# Patient Record
Sex: MALE | Race: WHITE | NOT HISPANIC OR LATINO | Employment: UNEMPLOYED | URBAN - METROPOLITAN AREA
[De-identification: names, ages, dates, MRNs, and addresses within clinical notes are randomized per-mention and may not be internally consistent; named-entity substitution may affect disease eponyms.]

---

## 2024-01-18 PROBLEM — R03.0 ELEVATED BLOOD PRESSURE READING: Status: ACTIVE | Noted: 2024-01-18

## 2024-01-18 PROBLEM — M25.462 EFFUSION OF LEFT KNEE: Status: ACTIVE | Noted: 2024-01-18

## 2024-01-18 PROBLEM — J06.9 ACUTE URI: Status: ACTIVE | Noted: 2024-01-18

## 2024-01-18 PROBLEM — R05.9 COUGH: Status: ACTIVE | Noted: 2024-01-18

## 2024-01-18 PROBLEM — R79.89 LOW TESTOSTERONE: Status: ACTIVE | Noted: 2024-01-18

## 2024-01-18 PROBLEM — D17.1 LIPOMA OF BREAST: Status: ACTIVE | Noted: 2024-01-18

## 2024-01-18 PROBLEM — R10.9 ABDOMINAL PAIN: Status: ACTIVE | Noted: 2024-01-18

## 2024-01-18 PROBLEM — M54.2 NECK PAIN: Status: ACTIVE | Noted: 2024-01-18

## 2024-01-18 PROBLEM — M54.50 LOW BACK PAIN RADIATING TO BOTH LEGS: Status: ACTIVE | Noted: 2024-01-18

## 2024-01-18 PROBLEM — M79.604 LOW BACK PAIN RADIATING TO BOTH LEGS: Status: ACTIVE | Noted: 2024-01-18

## 2024-01-18 PROBLEM — S83.005A CLOSED DISLOCATION OF LEFT PATELLA: Status: ACTIVE | Noted: 2024-01-18

## 2024-01-18 PROBLEM — G47.33 OSA (OBSTRUCTIVE SLEEP APNEA): Status: ACTIVE | Noted: 2024-01-18

## 2024-01-18 PROBLEM — G47.9 SLEEP DIFFICULTIES: Status: ACTIVE | Noted: 2024-01-18

## 2024-01-18 PROBLEM — M79.605 LOW BACK PAIN RADIATING TO BOTH LEGS: Status: ACTIVE | Noted: 2024-01-18

## 2024-01-18 PROBLEM — J02.9 SORE THROAT: Status: ACTIVE | Noted: 2024-01-18

## 2024-01-18 PROBLEM — J45.909 REACTIVE AIRWAY DISEASE (HHS-HCC): Status: ACTIVE | Noted: 2024-01-18

## 2024-01-18 PROBLEM — R63.5 ABNORMAL WEIGHT GAIN: Status: ACTIVE | Noted: 2024-01-18

## 2024-01-18 PROBLEM — R29.818 SUSPECTED SLEEP APNEA: Status: ACTIVE | Noted: 2024-01-18

## 2024-01-18 PROBLEM — G47.21 CIRCADIAN RHYTHM SLEEP DISORDER, DELAYED SLEEP PHASE TYPE: Status: ACTIVE | Noted: 2024-01-18

## 2024-01-18 PROBLEM — U07.1 COVID-19 VIRUS DETECTED: Status: ACTIVE | Noted: 2024-01-18

## 2024-01-18 PROBLEM — R11.2 NAUSEA AND/OR VOMITING: Status: ACTIVE | Noted: 2024-01-18

## 2024-01-18 PROBLEM — N63.0 BREAST MASS IN MALE: Status: ACTIVE | Noted: 2024-01-18

## 2024-01-18 PROBLEM — R25.3 MUSCLE TWITCHING: Status: ACTIVE | Noted: 2024-01-18

## 2024-01-18 PROBLEM — R51.9 HEADACHE: Status: ACTIVE | Noted: 2024-01-18

## 2024-01-18 PROBLEM — J02.9 ACUTE PHARYNGITIS: Status: ACTIVE | Noted: 2024-01-18

## 2024-04-11 ENCOUNTER — APPOINTMENT (OUTPATIENT)
Dept: RADIOLOGY | Facility: HOSPITAL | Age: 24
End: 2024-04-11
Payer: MEDICAID

## 2024-04-11 ENCOUNTER — HOSPITAL ENCOUNTER (EMERGENCY)
Facility: HOSPITAL | Age: 24
Discharge: HOME | End: 2024-04-11
Payer: MEDICAID

## 2024-04-11 ENCOUNTER — APPOINTMENT (OUTPATIENT)
Dept: PRIMARY CARE | Facility: CLINIC | Age: 24
End: 2024-04-11
Payer: MEDICAID

## 2024-04-11 ENCOUNTER — APPOINTMENT (OUTPATIENT)
Dept: VASCULAR MEDICINE | Facility: HOSPITAL | Age: 24
End: 2024-04-11
Payer: MEDICAID

## 2024-04-11 VITALS
DIASTOLIC BLOOD PRESSURE: 67 MMHG | HEART RATE: 84 BPM | TEMPERATURE: 97.3 F | BODY MASS INDEX: 42.66 KG/M2 | HEIGHT: 72 IN | OXYGEN SATURATION: 100 % | SYSTOLIC BLOOD PRESSURE: 148 MMHG | RESPIRATION RATE: 20 BRPM | WEIGHT: 315 LBS

## 2024-04-11 DIAGNOSIS — M79.89 OTHER SPECIFIED SOFT TISSUE DISORDERS: ICD-10-CM

## 2024-04-11 DIAGNOSIS — R60.0 BILATERAL LEG EDEMA: Primary | ICD-10-CM

## 2024-04-11 DIAGNOSIS — B35.3 TINEA PEDIS OF BOTH FEET: ICD-10-CM

## 2024-04-11 LAB — BODY SURFACE AREA: 3.46 M2

## 2024-04-11 PROCEDURE — 99284 EMERGENCY DEPT VISIT MOD MDM: CPT | Mod: 25

## 2024-04-11 PROCEDURE — 93970 EXTREMITY STUDY: CPT

## 2024-04-11 PROCEDURE — 73610 X-RAY EXAM OF ANKLE: CPT | Mod: RT

## 2024-04-11 PROCEDURE — 93970 EXTREMITY STUDY: CPT | Performed by: INTERNAL MEDICINE

## 2024-04-11 PROCEDURE — 73610 X-RAY EXAM OF ANKLE: CPT | Mod: RIGHT SIDE | Performed by: RADIOLOGY

## 2024-04-11 RX ORDER — FUROSEMIDE 20 MG/1
10 TABLET ORAL DAILY
Qty: 5 TABLET | Refills: 0 | Status: SHIPPED | OUTPATIENT
Start: 2024-04-11 | End: 2024-06-07 | Stop reason: WASHOUT

## 2024-04-11 ASSESSMENT — PAIN - FUNCTIONAL ASSESSMENT: PAIN_FUNCTIONAL_ASSESSMENT: 0-10

## 2024-04-11 ASSESSMENT — COLUMBIA-SUICIDE SEVERITY RATING SCALE - C-SSRS
1. IN THE PAST MONTH, HAVE YOU WISHED YOU WERE DEAD OR WISHED YOU COULD GO TO SLEEP AND NOT WAKE UP?: NO
6. HAVE YOU EVER DONE ANYTHING, STARTED TO DO ANYTHING, OR PREPARED TO DO ANYTHING TO END YOUR LIFE?: NO
2. HAVE YOU ACTUALLY HAD ANY THOUGHTS OF KILLING YOURSELF?: NO

## 2024-04-11 ASSESSMENT — PAIN SCALES - GENERAL: PAINLEVEL_OUTOF10: 5 - MODERATE PAIN

## 2024-04-11 NOTE — ED PROVIDER NOTES
"CC: Leg Swelling (Bilateral leg swelling. Pt states injured right ankle 2 months ago and is still swollen. States was on \"water pill\" but stopped taking it.)     HPI:   Patient is a 23-year-old male with morbid obesity presenting due to multiple months of bilateral lower extremity pain and swelling.  Patient reports that he has gone to see podiatry for history of athlete's foot and does not use the prescribed medication for his feet.  He reports a largely sedentary lifestyle and plays videogames.  Patient has not had any recent lengthy travel.  Patient reports that his pain is worse when he first gets up in the morning and tries to ambulate.  He has not had any recent falls but does note that about 3 months ago he thinks he twisted his right ankle.  Patient has noted recurrent swelling over that area every morning.  Patient has athlete's foot and has intermittent redness over his bilateral feet with superficial skin chaffed.  Patient denies any fevers or chills, shortness of breath, hemoptysis or feelings of palpitations.  He denies taking any medications on a daily basis and denies any significant pain in his bilateral lower extremities.  Patient reports was largely dependent edema depending on positioning that is worse with sitting in a chair and when he initially ambulates.  Patient's father does have heart failure.  There is no family history of DVTs or PEs reported.  Patient has mild anterior right shin erythema.    Limitations to History: none  Additional History Obtained from: mother    PMHx/PSHx:  Per HPI.   - has a past medical history of Acute pharyngitis, unspecified (01/23/2017), Acute suppurative otitis media without spontaneous rupture of ear drum, left ear (12/02/2015), Acute upper respiratory infection, unspecified (12/08/2015), Chronic sinusitis, unspecified (04/05/2016), Cough, unspecified (11/30/2015), Otitis media, unspecified, left ear (02/23/2016), Personal history of other diseases of the " respiratory system (09/29/2014), Personal history of other diseases of the respiratory system (12/14/2016), Spontaneous ecchymoses (04/25/2014), Sprain of unspecified ligament of unspecified ankle, initial encounter (05/02/2014), and Viral infection, unspecified (11/27/2015).  - has a past surgical history that includes Knee surgery (04/27/2018).    Social History:  - Tobacco:  has no history on file for tobacco use.   - Alcohol:  has no history on file for alcohol use.   - Drugs:  has no history on file for drug use.     Medications: Reviewed in EMR.     Allergies:  Patient has no known allergies.    ???????????????????????????????????????????????????????????????  Triage Vitals:  T 36.3 °C (97.3 °F)  HR 97  /88  RR (!) 22  O2 95 %      Physical Exam  Vitals and nursing note reviewed.   Constitutional:       General: He is not in acute distress.     Appearance: He is well-developed.   HENT:      Head: Normocephalic and atraumatic.   Eyes:      Conjunctiva/sclera: Conjunctivae normal.   Cardiovascular:      Rate and Rhythm: Normal rate and regular rhythm.      Heart sounds: No murmur heard.  Pulmonary:      Effort: Pulmonary effort is normal. No respiratory distress.      Breath sounds: Normal breath sounds.   Abdominal:      Palpations: Abdomen is soft.      Tenderness: There is no abdominal tenderness.   Musculoskeletal:         General: Swelling (bilateral LE swelling with pitting edema to the level of knees) present.      Cervical back: Neck supple.   Skin:     General: Skin is warm and dry.      Capillary Refill: Capillary refill takes less than 2 seconds.      Findings: Erythema (over entire L foot and mild erythema over R anterior shin) present.   Neurological:      Mental Status: He is alert and oriented to person, place, and time.      Cranial Nerves: No cranial nerve deficit.      Motor: No weakness.      Gait: Gait normal.       ???????????????????????????????????????????????????????????????  ED  Course  ED Course as of 04/12/24 0756   Thu Apr 11, 2024   1237 Patient's chest x-ray does show signs of old fracture of the posterior distal tibia without any acute fractures noted. [RR]      ED Course User Index  [RR] Anika Patel MD         Diagnoses as of 04/12/24 0756   Bilateral leg edema   Tinea pedis of both feet       Medical Decision Making:  Patient is a 23-year-old male with morbid obesity presenting due to chronic bilateral lower extremity pain and swelling.  Differentials considered but not limited to DVT, fracture, lymphedema, acute limb ischemia, athlete's foot.    Based on patient's history and physical examination x-ray of his right ankle was obtained given his history of sprain and that lower extremity 3 months ago.  He also will get bilateral DVT ultrasounds.  Patient likely has chronic edema secondary to dependent positioning will be prescribed with a short course of Lasix and told to follow-up with his primary care doctor. Does have some erythema to his lower extremity but upon further questioning patient endorses this being chronic.  States it comes and goes over his bilateral legs and today's presentation for the redness is not new.  With this information lower suspicion for cellulitis and patient does not have any warmth overlying this rash. He was told to continue to ambulate and exercise to both facilitate weight loss and improve lymphatic flow.  He was told to take his prescribed topical ointment for his athlete's foot and educated on cellulitis precautions.  Patient will be given a 1 week trial course of Lasix to help with drainage of excess fluid.  Patient was discharged in hemodynamically stable condition and patient care was overseen by attending physician agrees with the plan and disposition.    External records reviewed: recent inpatient, clinic, and prior ED notes  Diagnostic imaging independently reviewed/interpreted by me (as reflected in MDM) includes: X-ray,  ultrasound  Social Determinants Affecting Care: Poor health literacy  Discussion of management with other providers: Attending  Prescription Drug Consideration: Motrin, Lasix  Escalation of Care: None    Impression:   Lymphedema  RLE pain  Bilateral LE swelling    Disposition: Discharge      Procedures ? SmartLinks last updated 4/12/2024 7:56 AM     Anika Patel  PGY-2 Emergency Medicine  [facility]     Anika Patel MD  Resident  04/11/24 1240       Anika Patel MD  Resident  04/12/24 7702       Joaquín Donovan MD  04/13/24 3498

## 2024-04-11 NOTE — DISCHARGE INSTRUCTIONS
You are seen today due to concerns of bilateral lower extremity swelling and edema.  You may have had a remote fracture in your right ankle however there is nothing acute.  You have been given a trial course of Lasix to see if that will help with your bilateral lower extremity edema.  Please continue to use the topical lotion prescribed by your podiatrist.  Please return if any worsening symptoms

## 2024-06-07 ENCOUNTER — OFFICE VISIT (OUTPATIENT)
Dept: CARDIOLOGY | Facility: CLINIC | Age: 24
End: 2024-06-07
Payer: COMMERCIAL

## 2024-06-07 VITALS
SYSTOLIC BLOOD PRESSURE: 162 MMHG | OXYGEN SATURATION: 96 % | BODY MASS INDEX: 68.49 KG/M2 | HEART RATE: 88 BPM | DIASTOLIC BLOOD PRESSURE: 78 MMHG | HEIGHT: 73 IN

## 2024-06-07 DIAGNOSIS — R60.0 LOWER EXTREMITY EDEMA: ICD-10-CM

## 2024-06-07 DIAGNOSIS — R06.02 SHORTNESS OF BREATH: ICD-10-CM

## 2024-06-07 DIAGNOSIS — R03.0 ELEVATED BLOOD PRESSURE READING: Primary | ICD-10-CM

## 2024-06-07 PROCEDURE — 99203 OFFICE O/P NEW LOW 30 MIN: CPT | Performed by: NURSE PRACTITIONER

## 2024-06-07 RX ORDER — FUROSEMIDE 40 MG/1
40 TABLET ORAL DAILY
Qty: 30 TABLET | Refills: 3 | Status: SHIPPED | OUTPATIENT
Start: 2024-06-07 | End: 2024-06-10 | Stop reason: SINTOL

## 2024-06-07 NOTE — PROGRESS NOTES
Jericho Katz is a 23 y.o. male     History Of Present Illness   Mr Katz is a morbidly obese male here for complaints of worsening lower extremity edema and shortness of breath that is progressively becoming worse since August of 23.  He denies any complaints of chest pain, but does complain of shortness of breath and orthopnea.  Because of his weight, he is now sleeping in a recliner and is unable to elevate his legs.  He is not able to wear compression stockings due to his weight.        Social HX          Family HX  No family history on file.       Review Of Systems   Constitutional: + feeling tired.   Eyes: no eyesight problems.  No vision loss or change in vision  ENT: no hearing loss and no nosebleeds.   Cardiovascular: No intermittent leg claudication,   No chest pain, no tightness or heavy pressure  + shortness of breath,  No palpitations,  +ower extremity edema  The heart rate is regular  +orthopnea  Respiratory: no chronic cough   + shortness of breath.   Gastrointestinal: no change in bowel habits and no blood in stools.   Genitourinary: no urinary frequency.   Skin: no skin rashes. One abrasion to the right lower leg  Neurological: No frequent falls.   No dizziness  No weakness  Denies headaches  Psychiatric: no depression and not suicidal.   All other systems have been reviewed and are negative for complaint.        Allergies  No Known Allergies       Vitals  162/78      Physical Exam  Constitutional: alert and in no acute distress.   Eyes: no erythema, swelling or discharge from the eye .   Neck: neck is supple, symmetric, trachea midline, no masses  and no thyromegaly .   Pulmonary: No increased work of breathing or signs of respiratory distress    Lungs clear to auscultation.    No friction rub.  Cardiovascular: carotid pulses 2+ bilaterally with no bruit    JVP was normal, no thrills ,   Regular rhythm, normal S1 and S2,   + murmur radiating up to the right neck area   Pedal pulses-unable to palpate     +lymphedema  Abdomen: abdomen non-tender, no masses  and no hepatomegaly . No pulsatile mass noted  Skin: skin warm and dry, normal skin turgor . Small abrasion to the right lower leg  Psychiatric judgment and insight is normal  and oriented to person, place and time .           Current/Home Meds    Current Outpatient Medications:     furosemide (Lasix) 20 mg tablet, Take 0.5 tablets (10 mg) by mouth once daily for 10 days., Disp: 5 tablet, Rfl: 0    hydroCHLOROthiazide (HYDRODiuril) 12.5 mg tablet, Take 1 tablet (12.5 mg) by mouth once every 24 hours., Disp: , Rfl:        Labs           EKG Findings  EKG:   Normal sinus rhythm        Cardiac Service Results:  No results found for this or any previous visit from the past 365 days.        Assessment/Plan      LYMPHEDEMA:  Patient has increased edema and shortness of breath that is progressively becoming worse since August.  His weight is up to 520lbs.  He does have +1 pitting edema to his bilateral lower legs.  Previous venous duplex was inconclusive.  Will start lasix 40mg daily.  Will schedule an echocardiogram and a venous duplex with reflux.  Will have him under go a CMP, BNP and uric acid level in 2 weeks.  I have provided him with a prescription for compression wraps and directions for use.  I would like him to consume a low sodium diet and elevate his legs as tolerated.  He will follow up with me after testing to review results.  May need to consider a referral to the lymphedema clinic and lymphedema pumps and a consult to weight loss clinic.  He will also need to establish with a PCP to address his elevated TSH levels.

## 2024-06-10 ENCOUNTER — TELEPHONE (OUTPATIENT)
Dept: CARDIOLOGY | Facility: CLINIC | Age: 24
End: 2024-06-10
Payer: MEDICAID

## 2024-06-10 DIAGNOSIS — R03.0 ELEVATED BLOOD PRESSURE READING: Primary | ICD-10-CM

## 2024-06-10 DIAGNOSIS — R63.5 ABNORMAL WEIGHT GAIN: ICD-10-CM

## 2024-06-10 RX ORDER — SPIRONOLACTONE 25 MG/1
25 TABLET ORAL DAILY
Qty: 30 TABLET | Refills: 11 | Status: SHIPPED | OUTPATIENT
Start: 2024-06-10 | End: 2025-06-10

## 2024-06-10 NOTE — TELEPHONE ENCOUNTER
Nadia notified Phylicia [place] while patient's father was at . Spironolactone 25mg dailly was prescribed and sent to pharmacy by Phylicia.

## 2024-06-10 NOTE — TELEPHONE ENCOUNTER
Good afternoon,  Patient father was at office looking fir a follow up about his son medication, Furosemide 40 mg. He said medication is not working but wife said said he just has to gave sometime to work. Please gave him a call for advise, maybe the son need another medication or need time for the medication to work. Thanks  Denise

## 2024-06-15 PROBLEM — G47.9 SLEEP DIFFICULTIES: Status: RESOLVED | Noted: 2024-01-18 | Resolved: 2024-06-15

## 2024-06-15 PROBLEM — R05.9 COUGH: Status: RESOLVED | Noted: 2024-01-18 | Resolved: 2024-06-15

## 2024-06-15 PROBLEM — J45.909 REACTIVE AIRWAY DISEASE (HHS-HCC): Status: RESOLVED | Noted: 2024-01-18 | Resolved: 2024-06-15

## 2024-06-15 PROBLEM — J06.9 ACUTE URI: Status: RESOLVED | Noted: 2024-01-18 | Resolved: 2024-06-15

## 2024-06-15 PROBLEM — R29.818 SUSPECTED SLEEP APNEA: Status: RESOLVED | Noted: 2024-01-18 | Resolved: 2024-06-15

## 2024-06-15 PROBLEM — G47.21 CIRCADIAN RHYTHM SLEEP DISORDER, DELAYED SLEEP PHASE TYPE: Status: RESOLVED | Noted: 2024-01-18 | Resolved: 2024-06-15

## 2024-06-15 PROBLEM — J02.9 ACUTE PHARYNGITIS: Status: RESOLVED | Noted: 2024-01-18 | Resolved: 2024-06-15

## 2024-06-15 PROBLEM — U07.1 COVID-19 VIRUS DETECTED: Status: RESOLVED | Noted: 2024-01-18 | Resolved: 2024-06-15

## 2024-06-15 PROBLEM — J02.9 SORE THROAT: Status: RESOLVED | Noted: 2024-01-18 | Resolved: 2024-06-15

## 2024-06-17 ENCOUNTER — APPOINTMENT (OUTPATIENT)
Dept: CARDIOLOGY | Facility: HOSPITAL | Age: 24
End: 2024-06-17
Payer: COMMERCIAL

## 2024-06-17 ENCOUNTER — APPOINTMENT (OUTPATIENT)
Dept: PRIMARY CARE | Facility: CLINIC | Age: 24
End: 2024-06-17
Payer: COMMERCIAL

## 2024-06-17 ENCOUNTER — APPOINTMENT (OUTPATIENT)
Dept: VASCULAR MEDICINE | Facility: HOSPITAL | Age: 24
End: 2024-06-17
Payer: COMMERCIAL

## 2024-06-17 VITALS
TEMPERATURE: 97.3 F | HEART RATE: 84 BPM | DIASTOLIC BLOOD PRESSURE: 80 MMHG | OXYGEN SATURATION: 96 % | HEIGHT: 71 IN | WEIGHT: 315 LBS | BODY MASS INDEX: 44.1 KG/M2 | SYSTOLIC BLOOD PRESSURE: 138 MMHG

## 2024-06-17 DIAGNOSIS — E55.9 VITAMIN D DEFICIENCY: Primary | ICD-10-CM

## 2024-06-17 DIAGNOSIS — R60.9 EDEMA, UNSPECIFIED TYPE: ICD-10-CM

## 2024-06-17 DIAGNOSIS — E66.01 CLASS 3 SEVERE OBESITY WITH BODY MASS INDEX (BMI) GREATER THAN OR EQUAL TO 70 IN ADULT, UNSPECIFIED OBESITY TYPE, UNSPECIFIED WHETHER SERIOUS COMORBIDITY PRESENT (MULTI): ICD-10-CM

## 2024-06-17 DIAGNOSIS — R63.5 ABNORMAL WEIGHT GAIN: ICD-10-CM

## 2024-06-17 DIAGNOSIS — R79.89 ABNORMAL TSH: ICD-10-CM

## 2024-06-17 PROBLEM — R25.3 MUSCLE TWITCHING: Status: RESOLVED | Noted: 2024-01-18 | Resolved: 2024-06-17

## 2024-06-17 PROBLEM — R03.0 ELEVATED BLOOD PRESSURE READING: Status: RESOLVED | Noted: 2024-01-18 | Resolved: 2024-06-17

## 2024-06-17 PROBLEM — N63.0 BREAST MASS IN MALE: Status: RESOLVED | Noted: 2024-01-18 | Resolved: 2024-06-17

## 2024-06-17 PROBLEM — M54.2 NECK PAIN: Status: RESOLVED | Noted: 2024-01-18 | Resolved: 2024-06-17

## 2024-06-17 PROBLEM — M25.462 EFFUSION OF LEFT KNEE: Status: RESOLVED | Noted: 2024-01-18 | Resolved: 2024-06-17

## 2024-06-17 PROBLEM — R11.2 NAUSEA AND/OR VOMITING: Status: RESOLVED | Noted: 2024-01-18 | Resolved: 2024-06-17

## 2024-06-17 PROBLEM — G47.33 OSA (OBSTRUCTIVE SLEEP APNEA): Status: RESOLVED | Noted: 2024-01-18 | Resolved: 2024-06-17

## 2024-06-17 PROBLEM — R10.9 ABDOMINAL PAIN: Status: RESOLVED | Noted: 2024-01-18 | Resolved: 2024-06-17

## 2024-06-17 PROCEDURE — 99204 OFFICE O/P NEW MOD 45 MIN: CPT | Performed by: INTERNAL MEDICINE

## 2024-06-17 PROCEDURE — 3008F BODY MASS INDEX DOCD: CPT | Performed by: INTERNAL MEDICINE

## 2024-06-17 RX ORDER — FUROSEMIDE 40 MG/1
40 TABLET ORAL DAILY
COMMUNITY

## 2024-06-17 RX ORDER — DEXAMETHASONE 1 MG/1
1 TABLET ORAL ONCE
Qty: 1 TABLET | Refills: 0 | Status: SHIPPED | OUTPATIENT
Start: 2024-06-17 | End: 2024-06-17

## 2024-06-17 ASSESSMENT — LIFESTYLE VARIABLES
AUDIT-C TOTAL SCORE: 1
HOW MANY STANDARD DRINKS CONTAINING ALCOHOL DO YOU HAVE ON A TYPICAL DAY: 1 OR 2
HOW OFTEN DO YOU HAVE SIX OR MORE DRINKS ON ONE OCCASION: NEVER
SKIP TO QUESTIONS 9-10: 1
HOW OFTEN DO YOU HAVE A DRINK CONTAINING ALCOHOL: MONTHLY OR LESS

## 2024-06-17 ASSESSMENT — PATIENT HEALTH QUESTIONNAIRE - PHQ9
1. LITTLE INTEREST OR PLEASURE IN DOING THINGS: NOT AT ALL
2. FEELING DOWN, DEPRESSED OR HOPELESS: NOT AT ALL
SUM OF ALL RESPONSES TO PHQ9 QUESTIONS 1 & 2: 0

## 2024-06-17 ASSESSMENT — PAIN SCALES - GENERAL: PAINLEVEL: 0-NO PAIN

## 2024-06-17 NOTE — PROGRESS NOTES
"Standard Progress Note  Chief Complaint   Patient presents with    Establish Care     New pt here to establish with Dr. Perea     No recent PCP  CNP cardiology for LE edema  Planning echo and repeat US LE-does not want to go to [facility] if possible    Had sleep study several years ago-he recalls it being ok    Left patella shifted. Had surgery for cartilage in the past.    Weight since childhood. Mom states ever since he had table food.  She recalls doctor wanting to x-ray his hand because it looked abnormally large  Never been checked for Cushing's. Told by CNP thyroid problem on labs but last lab for this several years ago    Weight beginning of high school 350 lbs. Thinks his height is around 5'11\" but maybe taller    Working on [employer]. Dropped out of school because of trouble with classes and not able to obtain the help he needed.  Does stress eat sometimes.  Eating better this week.  Typically does not eat breakfast. Lunch sandwich. Dinner-chicken, rice, veg. If hungry later will get a sandwich.  In the last week not eating any sandwiches later in the day. Does not eat chips  Drinks mostly water. Sometimes powerade       Parents at visit    HPI:  Jericho Katz 23 y.o.   male    Patient Active Problem List   Diagnosis    Abnormal weight gain    Closed dislocation of left patella    Knee MCL sprain    Lipoma of breast    Low back pain radiating to both legs    Low testosterone    Headache    Obesity    Patellar instability     Single. No children.   Quit smoking around age 18-19. Vapes  Working on [employer]  Lives with parents.     Blood pressure 138/80, pulse 84, temperature 36.3 °C (97.3 °F), height 1.803 m (5' 11\"), weight (!) 239 kg (525 lb 12.8 oz), SpO2 96%.  Body mass index is 73.33 kg/m².  Very limited exam. Has to stand at one point because of discomfort on exam table.   HEENT nc/at EOMI. PERRL  Ears: TM intact  Throat: no exudate.  Vital reviewed  Neck: no cervical/clavicular adenopathy  CV: RRR S1 S2 normal. No " "murmur. No carotid bruit.   Lungs: CTA without wrr. Breath sounds symmetric  Abdomen: normoactive. Very limited exam  Extremities: symmetric  Neuro: speech intact. No facial asymmetry        Lab Results   Component Value Date    GLUCOSE 87 08/18/2020    CALCIUM 9.9 08/18/2020     08/18/2020    K 3.8 08/18/2020    CO2 25 08/18/2020     08/18/2020    BUN 22 08/18/2020    CREATININE 0.99 08/18/2020      Lab Results   Component Value Date    WBC 7.5 08/18/2020    HGB 13.4 (L) 08/18/2020    HCT 40.2 (L) 08/18/2020    MCV 86 08/18/2020     08/18/2020      Lab Results   Component Value Date    CHOL 138 08/18/2020    CHOL 144 02/08/2019     Lab Results   Component Value Date    HDL 33.5 (A) 08/18/2020    HDL 27.8 (A) 02/08/2019     No results found for: \"LDLCALC\"  Lab Results   Component Value Date    TRIG 104 08/18/2020    TRIG 152 (H) 02/08/2019     No components found for: \"CHOLHDL\"    1. Vitamin D deficiency  - Vitamin D 25-Hydroxy,Total (for eval of Vitamin D levels); Future    2. Abnormal weight gain  - Testosterone, total and free; Future  - T4, free; Future  - Triiodothyronine, Free; Future    3. Edema, unspecified type  Treatment per cardiology  - CBC; Future  - Comprehensive Metabolic Panel; Future    4. Abnormal TSH  - Thyroid Stimulating Hormone; Future  - T4, free; Future  - Triiodothyronine, Free; Future  - Referral to [facility]; Future    5. Class 3 severe obesity with body mass index (BMI) greater than or equal to 70 in adult, unspecified obesity type, unspecified whether serious comorbidity present (Multi)  - Home sleep apnea test (HSAT); Future  - dexAMETHasone (Decadron) 1 mg tablet; Take 1 tablet (1 mg) by mouth 1 time for 1 dose. Take at 11pm the night before the cortisol lab test  Dispense: 1 tablet; Refill: 0  - Cortisol; Future  - Referral to [facility]; Future  Check for Cushings  Check thyroid  Referral weight management  F/up 1month.       Current Outpatient " Medications on File Prior to Visit   Medication Sig Dispense Refill    furosemide (Lasix) 40 mg tablet Take 1 tablet (40 mg) by mouth once daily.      spironolactone (Aldactone) 25 mg tablet Take 1 tablet (25 mg) by mouth once daily. 30 tablet 11     No current facility-administered medications on file prior to visit.         Ita Perea MD

## 2024-06-19 ENCOUNTER — APPOINTMENT (OUTPATIENT)
Dept: VASCULAR MEDICINE | Facility: HOSPITAL | Age: 24
End: 2024-06-19
Payer: MEDICAID

## 2024-06-19 ENCOUNTER — TELEPHONE (OUTPATIENT)
Dept: PRIMARY CARE | Facility: CLINIC | Age: 24
End: 2024-06-19
Payer: COMMERCIAL

## 2024-06-19 ENCOUNTER — APPOINTMENT (OUTPATIENT)
Dept: CARDIOLOGY | Facility: HOSPITAL | Age: 24
End: 2024-06-19
Payer: MEDICAID

## 2024-06-19 DIAGNOSIS — E66.01 CLASS 3 SEVERE OBESITY WITH BODY MASS INDEX (BMI) GREATER THAN OR EQUAL TO 70 IN ADULT, UNSPECIFIED OBESITY TYPE, UNSPECIFIED WHETHER SERIOUS COMORBIDITY PRESENT (MULTI): Primary | ICD-10-CM

## 2024-06-19 NOTE — TELEPHONE ENCOUNTER
----- Message from Ita Ac MD sent at 6/19/2024  2:45 PM EDT -----  Regarding: FW: HST Order  Please let patient know that unfortunately his insurance will not cover a sleep study at home. They will only cover an in center sleep study. If he is amenable, will order in center sleep study.   ----- Message -----  From: Tanika Morrow  Sent: 6/19/2024  11:48 AM EDT  To: Ita Ac MD  Subject: HST Order                                        Good Morning,     You ordered an HST mail out for this pt. However under pts insurance plan, they do no cover an hst they will only cover an in center sleep study.     Please advise.     Thank you,     Tanika EATON

## 2024-06-20 ENCOUNTER — HOSPITAL ENCOUNTER (OUTPATIENT)
Dept: CARDIOLOGY | Facility: HOSPITAL | Age: 24
Discharge: HOME | End: 2024-06-20
Payer: COMMERCIAL

## 2024-06-20 ENCOUNTER — HOSPITAL ENCOUNTER (OUTPATIENT)
Dept: VASCULAR MEDICINE | Facility: HOSPITAL | Age: 24
Discharge: HOME | End: 2024-06-20
Payer: COMMERCIAL

## 2024-06-20 DIAGNOSIS — R03.0 ELEVATED BLOOD PRESSURE READING: ICD-10-CM

## 2024-06-20 DIAGNOSIS — R60.0 LOWER EXTREMITY EDEMA: ICD-10-CM

## 2024-06-20 DIAGNOSIS — R06.02 SHORTNESS OF BREATH: ICD-10-CM

## 2024-06-20 LAB
AORTIC VALVE MEAN GRADIENT: 4 MMHG
AORTIC VALVE PEAK VELOCITY: 1.42 M/S
AV PEAK GRADIENT: 8.1 MMHG
EJECTION FRACTION APICAL 4 CHAMBER: 60.8
EJECTION FRACTION: 63 %
MITRAL VALVE E/A RATIO: 1.18
RIGHT VENTRICLE FREE WALL PEAK S': 17.7 CM/S
TRICUSPID ANNULAR PLANE SYSTOLIC EXCURSION: 3.1 CM

## 2024-06-20 PROCEDURE — 93306 TTE W/DOPPLER COMPLETE: CPT | Performed by: STUDENT IN AN ORGANIZED HEALTH CARE EDUCATION/TRAINING PROGRAM

## 2024-06-20 PROCEDURE — 93970 EXTREMITY STUDY: CPT

## 2024-06-20 PROCEDURE — 2500000004 HC RX 250 GENERAL PHARMACY W/ HCPCS (ALT 636 FOR OP/ED): Performed by: NURSE PRACTITIONER

## 2024-06-20 PROCEDURE — 93306 TTE W/DOPPLER COMPLETE: CPT

## 2024-06-20 PROCEDURE — 93970 EXTREMITY STUDY: CPT | Performed by: INTERNAL MEDICINE

## 2024-06-26 ENCOUNTER — TELEPHONE (OUTPATIENT)
Dept: CARDIOLOGY | Facility: CLINIC | Age: 24
End: 2024-06-26
Payer: COMMERCIAL

## 2024-06-26 PROBLEM — J45.20 MILD INTERMITTENT ASTHMA (HHS-HCC): Status: ACTIVE | Noted: 2024-06-26

## 2024-06-26 PROBLEM — R23.3 PETECHIAE: Status: ACTIVE | Noted: 2024-06-26

## 2024-06-26 PROBLEM — S83.005A: Status: ACTIVE | Noted: 2024-06-26

## 2024-06-26 NOTE — TELEPHONE ENCOUNTER
Spoke with patient.  Echo reads normal EF.  No valve issues noted.  Vascular US concludes reflux noted bilaterally.  Patient has upcoming follow up with Dr. Johnson in July 2024.  Patient states he is feeling ok currently and swelling in legs has slightly decreased.  He will call us if needed prior to follow up .

## 2024-06-26 NOTE — TELEPHONE ENCOUNTER
Father is calling on behalf of the patient. Was inquiring about test results from Echo and Vascular US on Legs, ordered by Anna Colorado. Would like a call back at  [phone]

## 2024-06-28 ENCOUNTER — LAB (OUTPATIENT)
Dept: LAB | Facility: LAB | Age: 24
End: 2024-06-28
Payer: COMMERCIAL

## 2024-06-28 DIAGNOSIS — R03.0 ELEVATED BLOOD PRESSURE READING: ICD-10-CM

## 2024-06-28 DIAGNOSIS — R60.9 EDEMA, UNSPECIFIED TYPE: ICD-10-CM

## 2024-06-28 DIAGNOSIS — E55.9 VITAMIN D DEFICIENCY: ICD-10-CM

## 2024-06-28 DIAGNOSIS — E66.01 CLASS 3 SEVERE OBESITY WITH BODY MASS INDEX (BMI) GREATER THAN OR EQUAL TO 70 IN ADULT, UNSPECIFIED OBESITY TYPE, UNSPECIFIED WHETHER SERIOUS COMORBIDITY PRESENT (MULTI): ICD-10-CM

## 2024-06-28 DIAGNOSIS — R60.0 LOWER EXTREMITY EDEMA: ICD-10-CM

## 2024-06-28 DIAGNOSIS — R06.02 SHORTNESS OF BREATH: ICD-10-CM

## 2024-06-28 DIAGNOSIS — R79.89 ABNORMAL TSH: ICD-10-CM

## 2024-06-28 DIAGNOSIS — R63.5 ABNORMAL WEIGHT GAIN: ICD-10-CM

## 2024-06-28 LAB
25(OH)D3 SERPL-MCNC: 14 NG/ML (ref 30–100)
ALBUMIN SERPL BCP-MCNC: 4.7 G/DL (ref 3.4–5)
ALP SERPL-CCNC: 54 U/L (ref 33–120)
ALT SERPL W P-5'-P-CCNC: 31 U/L (ref 10–52)
ANION GAP SERPL CALC-SCNC: 13 MMOL/L (ref 10–20)
AST SERPL W P-5'-P-CCNC: 14 U/L (ref 9–39)
BILIRUB SERPL-MCNC: 0.7 MG/DL (ref 0–1.2)
BNP SERPL-MCNC: 6 PG/ML (ref 0–99)
BUN SERPL-MCNC: 18 MG/DL (ref 6–23)
CALCIUM SERPL-MCNC: 9.8 MG/DL (ref 8.6–10.3)
CHLORIDE SERPL-SCNC: 103 MMOL/L (ref 98–107)
CO2 SERPL-SCNC: 26 MMOL/L (ref 21–32)
CORTIS SERPL-MCNC: 0.8 UG/DL (ref 2.5–20)
CREAT SERPL-MCNC: 0.93 MG/DL (ref 0.5–1.3)
EGFRCR SERPLBLD CKD-EPI 2021: >90 ML/MIN/1.73M*2
ERYTHROCYTE [DISTWIDTH] IN BLOOD BY AUTOMATED COUNT: 14.4 % (ref 11.5–14.5)
GLUCOSE SERPL-MCNC: 139 MG/DL (ref 74–99)
HCT VFR BLD AUTO: 37.2 % (ref 41–52)
HGB BLD-MCNC: 12.8 G/DL (ref 13.5–17.5)
MCH RBC QN AUTO: 28.6 PG (ref 26–34)
MCHC RBC AUTO-ENTMCNC: 34.4 G/DL (ref 32–36)
MCV RBC AUTO: 83 FL (ref 80–100)
NRBC BLD-RTO: 0 /100 WBCS (ref 0–0)
PLATELET # BLD AUTO: 194 X10*3/UL (ref 150–450)
POTASSIUM SERPL-SCNC: 4.5 MMOL/L (ref 3.5–5.3)
PROT SERPL-MCNC: 7.3 G/DL (ref 6.4–8.2)
RBC # BLD AUTO: 4.48 X10*6/UL (ref 4.5–5.9)
SODIUM SERPL-SCNC: 137 MMOL/L (ref 136–145)
T3FREE SERPL-MCNC: 3.2 PG/ML (ref 2.3–4.2)
T4 FREE SERPL-MCNC: 0.77 NG/DL (ref 0.61–1.12)
TSH SERPL-ACNC: 1.6 MIU/L (ref 0.44–3.98)
URATE SERPL-MCNC: 8.1 MG/DL (ref 4–7.5)
WBC # BLD AUTO: 8.6 X10*3/UL (ref 4.4–11.3)

## 2024-06-28 PROCEDURE — 82533 TOTAL CORTISOL: CPT

## 2024-06-28 PROCEDURE — 84439 ASSAY OF FREE THYROXINE: CPT

## 2024-06-28 PROCEDURE — 80053 COMPREHEN METABOLIC PANEL: CPT

## 2024-06-28 PROCEDURE — 84443 ASSAY THYROID STIM HORMONE: CPT

## 2024-06-28 PROCEDURE — 84402 ASSAY OF FREE TESTOSTERONE: CPT

## 2024-06-28 PROCEDURE — 83880 ASSAY OF NATRIURETIC PEPTIDE: CPT

## 2024-06-28 PROCEDURE — 82306 VITAMIN D 25 HYDROXY: CPT

## 2024-06-28 PROCEDURE — 84550 ASSAY OF BLOOD/URIC ACID: CPT

## 2024-06-28 PROCEDURE — 85027 COMPLETE CBC AUTOMATED: CPT

## 2024-06-28 PROCEDURE — 84481 FREE ASSAY (FT-3): CPT

## 2024-06-28 PROCEDURE — 36415 COLL VENOUS BLD VENIPUNCTURE: CPT

## 2024-07-03 ENCOUNTER — TELEPHONE (OUTPATIENT)
Dept: PRIMARY CARE | Facility: CLINIC | Age: 24
End: 2024-07-03
Payer: COMMERCIAL

## 2024-07-03 DIAGNOSIS — E55.9 VITAMIN D DEFICIENCY: Primary | ICD-10-CM

## 2024-07-03 RX ORDER — CHOLECALCIFEROL (VITAMIN D3) 1250 MCG
TABLET ORAL
Qty: 8 TABLET | Refills: 0 | Status: SHIPPED | OUTPATIENT
Start: 2024-07-03

## 2024-07-04 LAB
TESTOSTERONE FREE (CHAN): 43 PG/ML (ref 35–155)
TESTOSTERONE,TOTAL,LC-MS/MS: 175 NG/DL (ref 250–1100)

## 2024-07-08 ENCOUNTER — TELEPHONE (OUTPATIENT)
Dept: CARDIOLOGY | Facility: CLINIC | Age: 24
End: 2024-07-08
Payer: COMMERCIAL

## 2024-07-08 NOTE — TELEPHONE ENCOUNTER
Pt's mom, Lizzette called today. She is inquiring why a diuretic (lasix) was stopped. Pt is still on spironolactone.    I called lizzette back. I explained that it appears that lasix was dc'd on 6/10 when aashish was ordered. I asked if spironolactone is helping with LE swelling. Lizzette does not believe aashish is helping much. Up until recently, he has been sleeping with legs being dependent, but just started sleeping in a bed and that seems to be helping. I reviewed with Phylicia, she states that with lymphedema, the diuretics wont help. I explained this to Lizzette. I told her he can stop aashish if he doesn't believe that it is helping or he can continue taking until seen by Dr Johnson on 7/15 (NPV). Lizzette voiced understanding.

## 2024-07-12 ENCOUNTER — APPOINTMENT (OUTPATIENT)
Dept: VASCULAR MEDICINE | Facility: HOSPITAL | Age: 24
End: 2024-07-12
Payer: COMMERCIAL

## 2024-07-12 ENCOUNTER — APPOINTMENT (OUTPATIENT)
Dept: CARDIOLOGY | Facility: HOSPITAL | Age: 24
End: 2024-07-12
Payer: COMMERCIAL

## 2024-07-15 ENCOUNTER — APPOINTMENT (OUTPATIENT)
Dept: CARDIOLOGY | Facility: CLINIC | Age: 24
End: 2024-07-15
Payer: COMMERCIAL

## 2024-07-15 VITALS
HEART RATE: 98 BPM | HEIGHT: 72 IN | SYSTOLIC BLOOD PRESSURE: 156 MMHG | OXYGEN SATURATION: 97 % | DIASTOLIC BLOOD PRESSURE: 100 MMHG | BODY MASS INDEX: 71.31 KG/M2

## 2024-07-15 DIAGNOSIS — I89.0 LYMPHEDEMA: Primary | ICD-10-CM

## 2024-07-15 DIAGNOSIS — E66.01 MORBID OBESITY (MULTI): ICD-10-CM

## 2024-07-15 DIAGNOSIS — I10 ESSENTIAL HYPERTENSION: ICD-10-CM

## 2024-07-15 DIAGNOSIS — R60.0 LOWER EXTREMITY EDEMA: ICD-10-CM

## 2024-07-15 PROCEDURE — 3080F DIAST BP >= 90 MM HG: CPT | Performed by: STUDENT IN AN ORGANIZED HEALTH CARE EDUCATION/TRAINING PROGRAM

## 2024-07-15 PROCEDURE — 3008F BODY MASS INDEX DOCD: CPT | Performed by: STUDENT IN AN ORGANIZED HEALTH CARE EDUCATION/TRAINING PROGRAM

## 2024-07-15 PROCEDURE — 99214 OFFICE O/P EST MOD 30 MIN: CPT | Performed by: STUDENT IN AN ORGANIZED HEALTH CARE EDUCATION/TRAINING PROGRAM

## 2024-07-15 PROCEDURE — 3077F SYST BP >= 140 MM HG: CPT | Performed by: STUDENT IN AN ORGANIZED HEALTH CARE EDUCATION/TRAINING PROGRAM

## 2024-07-15 RX ORDER — TORSEMIDE 20 MG/1
20 TABLET ORAL DAILY PRN
Qty: 30 TABLET | Refills: 11 | Status: SHIPPED | OUTPATIENT
Start: 2024-07-15 | End: 2025-07-15

## 2024-07-15 RX ORDER — LOSARTAN POTASSIUM 25 MG/1
25 TABLET ORAL DAILY
Qty: 30 TABLET | Refills: 11 | Status: SHIPPED | OUTPATIENT
Start: 2024-07-15 | End: 2025-07-15

## 2024-07-15 NOTE — PROGRESS NOTES
Referred by Dr. Fischer ref. provider found for venous insufficiency (Discuss testing and treatment plan)     History Of Present Illness:    Jericho Katz is a 23 y.o. male with morbid obesity presents to clinic for evaluation of bilateral lower extreme edema and hypertension.  Patient's been dealing with bilateral lower extreme edema and was initially seen by Rhea Mcneil.  Patient underwent echocardiogram which showed normal ejection fraction no severe valve pathology.  Bilateral ultrasound was negative for DVT showed minor reflux.  He was diagnosed with lymphedema at that time.  Blood test reviewed.  The chronic lower extreme edema apparently is worsened over last 8 months.  The swelling at times is tender around the shin region.  He was tried on a course of oral Lasix and torsemide with some improvement but he is currently not taking this therapy.  His baseline ECG reviewed showing normal sinus rhythm.    Past Medical History:  He has a past medical history of Acute pharyngitis, unspecified (01/23/2017), Acute suppurative otitis media without spontaneous rupture of ear drum, left ear (12/02/2015), Acute upper respiratory infection, unspecified (12/08/2015), Chronic sinusitis, unspecified (04/05/2016), Cough, unspecified (11/30/2015), Otitis media, unspecified, left ear (02/23/2016), Personal history of other diseases of the respiratory system (09/29/2014), Personal history of other diseases of the respiratory system (12/14/2016), Spontaneous ecchymoses (04/25/2014), Sprain of unspecified ligament of unspecified ankle, initial encounter (05/02/2014), and Viral infection, unspecified (11/27/2015).    Past Surgical History:  He has a past surgical history that includes Knee surgery (04/27/2018).      Social History:  He reports that he has been smoking. He uses smokeless tobacco. He reports current alcohol use. He reports that he does not use drugs.    Family History:  Family History   Problem Relation Name Age of  Onset    Heart attack Father      Hypertension Father      Hyperlipidemia Father      Seizures Father          Allergies:  Patient has no known allergies.    Outpatient Medications:  Current Outpatient Medications   Medication Instructions    cholecalciferol (Vitamin D3) 1,250 mcg (50,000 unit) tablet 1 po a week for 8 weeks. After completing take otc vitamin D 2000 units a day.    dexAMETHasone (DECADRON) 1 mg, oral, Once, Take at 11pm the night before the cortisol lab test    losartan (COZAAR) 25 mg, oral, Daily    torsemide (DEMADEX) 20 mg, oral, Daily PRN        Last Recorded Vitals:  Vitals:    07/15/24 1531   BP: (!) 156/100   BP Location: Right arm   Patient Position: Sitting   BP Cuff Size: Large adult   Pulse: 98   SpO2: 97%   Height: 1.829 m (6')       Physical Exam:  General: No acute distress,  A&O x3  Skin: Warm and dry  Neck: JVD is not elevated  ENT: Moist mucous membranes no lesions appreciated  Pulmonary: CTAB  Cards: Regular rate rhythm, no murmurs gallops or rubs appreciated normal S1-S2  Abdomen: Soft nontender nondistended  Extremities: Nonpitting bilateral swelling with lymphedematous changes  Psych: Appropriate mood and affect          Last Labs:  CBC -  Lab Results   Component Value Date    WBC 8.6 06/28/2024    HGB 12.8 (L) 06/28/2024    HCT 37.2 (L) 06/28/2024    MCV 83 06/28/2024     06/28/2024       CMP -  Lab Results   Component Value Date    CALCIUM 9.8 06/28/2024    PROT 7.3 06/28/2024    ALBUMIN 4.7 06/28/2024    AST 14 06/28/2024    ALT 31 06/28/2024    ALKPHOS 54 06/28/2024    BILITOT 0.7 06/28/2024       LIPID PANEL -   Lab Results   Component Value Date    CHOL 138 08/18/2020    TRIG 104 08/18/2020    HDL 33.5 (A) 08/18/2020    CHHDL 4.1 08/18/2020    LDLF 84 08/18/2020    VLDL 21 08/18/2020    NHDL 105 08/18/2020       RENAL FUNCTION PANEL -   Lab Results   Component Value Date    GLUCOSE 139 (H) 06/28/2024     06/28/2024    K 4.5 06/28/2024     06/28/2024     CO2 26 06/28/2024    ANIONGAP 13 06/28/2024    BUN 18 06/28/2024    CREATININE 0.93 06/28/2024    CALCIUM 9.8 06/28/2024    ALBUMIN 4.7 06/28/2024        Lab Results   Component Value Date    BNP 6 06/28/2024    HGBA1C 5.0 08/18/2020         Assessment/Plan     1.  Nonpitting bilateral lower extreme edema: Multifactorial mostly driven by a component of lymphedema and venous insufficiency in the setting of morbid obesity.  No evidence of heart failure.  Normal LV function.  -Initially tried a course of Lasix and spironolactone which patient is since discontinued  -Discussed in detail with patient and mother that diuretics alone are unlikely to solve problem.  Patient will need a more comprehensive plan that will include weight loss, referral to lymphedema clinic, use of diuretics when needed and leg elevation   -start torsemide 20 mg as needed for worsening lower extreme edema  -Referral to lymphedema clinic    2.  Hypertension: Blood pressures are poorly controlled.  add losartan 25 mg once a day.  -Recheck renal function in 2 weeks    3.  Morbid obesity: Patient has referral to weight loss clinic.     Take torsemide 20 mg daily as needed for swelling  Lymphedema clinic referral  Check labs in 2 weeks  Follow-up with weight loss clinic as scheduled  Return to our clinic within 3 to 6 months    (This note was generated with voice recognition software and may contain errors including spelling, grammar, syntax and missed recognition of what was dictated, of which may not have been fully corrected)       Ryland Johnson MD PhD

## 2024-07-19 ENCOUNTER — APPOINTMENT (OUTPATIENT)
Dept: CARDIOLOGY | Facility: CLINIC | Age: 24
End: 2024-07-19
Payer: MEDICAID

## 2024-07-22 ENCOUNTER — TELEPHONE (OUTPATIENT)
Dept: CARDIOLOGY | Facility: CLINIC | Age: 24
End: 2024-07-22
Payer: COMMERCIAL

## 2024-07-22 DIAGNOSIS — E66.01 MORBID OBESITY (MULTI): Primary | ICD-10-CM

## 2024-07-22 RX ORDER — BISACODYL 10 MG
25 SUPPOSITORY, RECTAL RECTAL DAILY
Qty: 1 EACH | Refills: 6 | Status: SHIPPED | OUTPATIENT
Start: 2024-07-22 | End: 2025-07-22

## 2024-07-22 NOTE — TELEPHONE ENCOUNTER
Patients mom called in stating they need a signed rx for the compression socks you wanted him to get.

## 2024-07-29 ENCOUNTER — TELEPHONE (OUTPATIENT)
Dept: CARDIOLOGY | Facility: CLINIC | Age: 24
End: 2024-07-29
Payer: COMMERCIAL

## 2024-07-29 DIAGNOSIS — E66.01 MORBID OBESITY (MULTI): ICD-10-CM

## 2024-07-29 NOTE — TELEPHONE ENCOUNTER
Spoke with patient- advised rx went to [facility] in error. Will need to go to a pharmacy that has DME- like [facility].  Patient's preferred location is [facility]. Advised will resend rx to [facility] and then it will go to [facility]. Understanding verb.

## 2024-07-29 NOTE — TELEPHONE ENCOUNTER
Jericho states that [facility] has still not received the signed order for compression stockings. Can we please resend. He also stated that they were going to be faxing a form over to our office.

## 2024-07-30 ENCOUNTER — TELEPHONE (OUTPATIENT)
Dept: CARDIOLOGY | Facility: CLINIC | Age: 24
End: 2024-07-30
Payer: COMMERCIAL

## 2024-07-30 RX ORDER — BISACODYL 10 MG
25 SUPPOSITORY, RECTAL RECTAL DAILY
Qty: 1 EACH | Refills: 6 | Status: SHIPPED | OUTPATIENT
Start: 2024-07-30 | End: 2025-07-30

## 2024-07-30 NOTE — TELEPHONE ENCOUNTER
Good morning,   Patient is calling about the compression socks. She said the rx is supposed to go to [facility] on [address] in [place]. The fax number is [phone]. [facility] [place], pt said that's not the one. Please if this can be resend ASA. Any questions or concerns please call patient mom at [phone]. Her name is Lizzette.   Thanks  Denise

## 2024-07-31 ENCOUNTER — APPOINTMENT (OUTPATIENT)
Dept: PRIMARY CARE | Facility: CLINIC | Age: 24
End: 2024-07-31
Payer: COMMERCIAL

## 2024-07-31 NOTE — TELEPHONE ENCOUNTER
Spoke with patient's father- advised they can request [facility] to transfer rx to any [facility] they would like. Father stated it is already taken care of.

## 2024-08-22 ENCOUNTER — APPOINTMENT (OUTPATIENT)
Dept: PRIMARY CARE | Facility: CLINIC | Age: 24
End: 2024-08-22
Payer: COMMERCIAL

## 2024-08-22 VITALS
WEIGHT: 315 LBS | HEART RATE: 97 BPM | HEIGHT: 72 IN | BODY MASS INDEX: 42.66 KG/M2 | OXYGEN SATURATION: 98 % | DIASTOLIC BLOOD PRESSURE: 98 MMHG | SYSTOLIC BLOOD PRESSURE: 140 MMHG | TEMPERATURE: 98.5 F

## 2024-08-22 DIAGNOSIS — F32.A DEPRESSION, UNSPECIFIED DEPRESSION TYPE: ICD-10-CM

## 2024-08-22 DIAGNOSIS — F41.9 ANXIETY: ICD-10-CM

## 2024-08-22 DIAGNOSIS — E66.01 MORBID OBESITY (MULTI): Primary | ICD-10-CM

## 2024-08-22 PROCEDURE — 3080F DIAST BP >= 90 MM HG: CPT | Performed by: INTERNAL MEDICINE

## 2024-08-22 PROCEDURE — 3077F SYST BP >= 140 MM HG: CPT | Performed by: INTERNAL MEDICINE

## 2024-08-22 PROCEDURE — 3008F BODY MASS INDEX DOCD: CPT | Performed by: INTERNAL MEDICINE

## 2024-08-22 PROCEDURE — 99214 OFFICE O/P EST MOD 30 MIN: CPT | Performed by: INTERNAL MEDICINE

## 2024-08-22 RX ORDER — BUPROPION HYDROCHLORIDE 75 MG/1
TABLET ORAL
Qty: 60 TABLET | Refills: 1 | Status: SHIPPED | OUTPATIENT
Start: 2024-08-22

## 2024-08-22 ASSESSMENT — ENCOUNTER SYMPTOMS
LOSS OF SENSATION IN FEET: 0
OCCASIONAL FEELINGS OF UNSTEADINESS: 0
DEPRESSION: 0

## 2024-08-22 ASSESSMENT — PATIENT HEALTH QUESTIONNAIRE - PHQ9
10. IF YOU CHECKED OFF ANY PROBLEMS, HOW DIFFICULT HAVE THESE PROBLEMS MADE IT FOR YOU TO DO YOUR WORK, TAKE CARE OF THINGS AT HOME, OR GET ALONG WITH OTHER PEOPLE: SOMEWHAT DIFFICULT
6. FEELING BAD ABOUT YOURSELF - OR THAT YOU ARE A FAILURE OR HAVE LET YOURSELF OR YOUR FAMILY DOWN: MORE THAN HALF THE DAYS
4. FEELING TIRED OR HAVING LITTLE ENERGY: NEARLY EVERY DAY
3. TROUBLE FALLING OR STAYING ASLEEP: SEVERAL DAYS
1. LITTLE INTEREST OR PLEASURE IN DOING THINGS: MORE THAN HALF THE DAYS
SUM OF ALL RESPONSES TO PHQ QUESTIONS 1-9: 12
8. MOVING OR SPEAKING SO SLOWLY THAT OTHER PEOPLE COULD HAVE NOTICED. OR THE OPPOSITE, BEING SO FIGETY OR RESTLESS THAT YOU HAVE BEEN MOVING AROUND A LOT MORE THAN USUAL: NOT AT ALL
9. THOUGHTS THAT YOU WOULD BE BETTER OFF DEAD, OR OF HURTING YOURSELF: NOT AT ALL
2. FEELING DOWN, DEPRESSED OR HOPELESS: MORE THAN HALF THE DAYS
SUM OF ALL RESPONSES TO PHQ9 QUESTIONS 1 & 2: 4
7. TROUBLE CONCENTRATING ON THINGS, SUCH AS READING THE NEWSPAPER OR WATCHING TELEVISION: SEVERAL DAYS
5. POOR APPETITE OR OVEREATING: SEVERAL DAYS

## 2024-08-22 ASSESSMENT — PAIN SCALES - GENERAL: PAINLEVEL: 0-NO PAIN

## 2024-08-22 NOTE — PROGRESS NOTES
"Standard Progress Note  Chief Complaint   Patient presents with    Follow-up     Pt here for 2 mo FUV.  Pt states \"I want to talk about anxiety.  I don't feel that I have major depression, but sometimes it makes me not want to do things that I want to do.\"     24 year old man  Last visit 7/2024    cardiology for LE edema.Dr Ryland Johnson  ECHO and US done. Dx lymphedema. Referred to lymphedema clinic. Obtaining stockings  Anxiety and depression. Has not really talked to anyone about it  Will stress eat large amounts of food at night. Will feel he has to eat but knows he is not hungry.  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  He does not like his job.  He does not socialize with anybody at work.  He states people just cannot go there and do their work and do not really socialize.  He lives at home with his parents.  He states he does not have enough money to live outside the home at this point.      HPI:  Jericho Katz 24 y.o.   male    Patient Active Problem List   Diagnosis    Abnormal weight gain    Closed dislocation of left patella    Knee MCL sprain    Lipoma of breast    Low back pain radiating to both legs    Low testosterone    Headache    Morbid obesity (Multi)    Obstructive sleep apnea syndrome    Patellar instability    Mild intermittent asthma ([facility])    Closed traumatic dislocation of left patellofemoral joint    Petechiae    Lymphedema    Lower extremity edema    Essential hypertension     Single. No children.   Quit smoking around age [age]. Vapes  Working on [employer]  Lives with parents.  Working in a law office.  Desk job.    Blood pressure (!) 140/98, pulse 97, temperature 36.9 °C (98.5 °F), height 1.829 m (6'), weight (!) 236 kg (520 lb), SpO2 98%.  Body mass index is 70.52 kg/m².  Patient standing during evaluation.  He states that he cannot fit into the furniture.  He does make good eye contact.  He is appropriately " "conversant.  Speech normal rate    Labs June 2024 cortisol, T3, T4, testosterone, vitamin D, CBC, TSH, uric acid, CMP, BNP  Testosterone low  Vitamin D low 14  Hemoglobin low 12.8    Lab Results   Component Value Date    GLUCOSE 139 (H) 06/28/2024    CALCIUM 9.8 06/28/2024     06/28/2024    K 4.5 06/28/2024    CO2 26 06/28/2024     06/28/2024    BUN 18 06/28/2024    CREATININE 0.93 06/28/2024      Lab Results   Component Value Date    WBC 8.6 06/28/2024    HGB 12.8 (L) 06/28/2024    HCT 37.2 (L) 06/28/2024    MCV 83 06/28/2024     06/28/2024      Lab Results   Component Value Date    CHOL 138 08/18/2020    CHOL 144 02/08/2019     Lab Results   Component Value Date    HDL 33.5 (A) 08/18/2020    HDL 27.8 (A) 02/08/2019     No results found for: \"LDLCALC\"  Lab Results   Component Value Date    TRIG 104 08/18/2020    TRIG 152 (H) 02/08/2019     1. Morbid obesity (Multi)  He states they have had a problem with insurance not covering weight management clinic.  Will see if he is able to go to [facility].  - Referral to [facility]; Future  - Referral to Psychology; Future    2. Anxiety  Denies history of seizures.  Denies history of self-induced vomiting or abusing laxatives.  Will try Wellbutrin.  Follow-up in 1 month  - buPROPion (Wellbutrin) 75 mg tablet; 1 po every day for 3 days then increase to 1 po bid  Dispense: 60 tablet; Refill: 1  - Referral to Psychology; Future    3. Depression, unspecified depression type  - buPROPion (Wellbutrin) 75 mg tablet; 1 po every day for 3 days then increase to 1 po bid  Dispense: 60 tablet; Refill: 1  - Referral to Psychology; Future  Close follow-up.    Current Outpatient Medications on File Prior to Visit   Medication Sig Dispense Refill    cholecalciferol (Vitamin D3) 1,250 mcg (50,000 unit) tablet 1 po a week for 8 weeks. After completing take otc vitamin D 2000 units a day. 8 tablet 0    compression socks, x-large misc 25 mm once daily. 25-30 " mmHG of pressure for lymphedema 1 each 6    losartan (Cozaar) 25 mg tablet Take 1 tablet (25 mg) by mouth once daily. 30 tablet 11    torsemide (Demadex) 20 mg tablet Take 1 tablet (20 mg) by mouth once daily as needed (Leg swelling). 30 tablet 11    dexAMETHasone (Decadron) 1 mg tablet Take 1 tablet (1 mg) by mouth 1 time for 1 dose. Take at 11pm the night before the cortisol lab test 1 tablet 0     No current facility-administered medications on file prior to visit.         Ita Perea MD

## 2024-09-03 ENCOUNTER — APPOINTMENT (OUTPATIENT)
Dept: VASCULAR SURGERY | Facility: CLINIC | Age: 24
End: 2024-09-03
Payer: COMMERCIAL

## 2024-09-23 ENCOUNTER — APPOINTMENT (OUTPATIENT)
Dept: CARDIOLOGY | Facility: CLINIC | Age: 24
End: 2024-09-23
Payer: COMMERCIAL

## 2024-09-24 ENCOUNTER — APPOINTMENT (OUTPATIENT)
Dept: PRIMARY CARE | Facility: CLINIC | Age: 24
End: 2024-09-24
Payer: COMMERCIAL

## 2024-09-25 ENCOUNTER — OFFICE VISIT (OUTPATIENT)
Dept: CARDIOLOGY | Facility: CLINIC | Age: 24
End: 2024-09-25
Payer: COMMERCIAL

## 2024-09-25 ENCOUNTER — LAB (OUTPATIENT)
Dept: LAB | Facility: LAB | Age: 24
End: 2024-09-25
Payer: COMMERCIAL

## 2024-09-25 VITALS
HEIGHT: 72 IN | OXYGEN SATURATION: 96 % | HEART RATE: 102 BPM | SYSTOLIC BLOOD PRESSURE: 142 MMHG | BODY MASS INDEX: 70.52 KG/M2 | DIASTOLIC BLOOD PRESSURE: 60 MMHG

## 2024-09-25 DIAGNOSIS — I10 ESSENTIAL HYPERTENSION: ICD-10-CM

## 2024-09-25 DIAGNOSIS — R60.0 LOWER EXTREMITY EDEMA: ICD-10-CM

## 2024-09-25 DIAGNOSIS — I89.0 LYMPHEDEMA: ICD-10-CM

## 2024-09-25 PROBLEM — R63.5 ABNORMAL WEIGHT GAIN: Status: RESOLVED | Noted: 2024-01-18 | Resolved: 2024-09-25

## 2024-09-25 LAB
ANION GAP SERPL CALC-SCNC: 12 MMOL/L (ref 10–20)
BNP SERPL-MCNC: 15 PG/ML (ref 0–99)
BUN SERPL-MCNC: 13 MG/DL (ref 6–23)
CALCIUM SERPL-MCNC: 9 MG/DL (ref 8.6–10.3)
CHLORIDE SERPL-SCNC: 103 MMOL/L (ref 98–107)
CO2 SERPL-SCNC: 27 MMOL/L (ref 21–32)
CREAT SERPL-MCNC: 1.01 MG/DL (ref 0.5–1.3)
EGFRCR SERPLBLD CKD-EPI 2021: >90 ML/MIN/1.73M*2
GLUCOSE SERPL-MCNC: 140 MG/DL (ref 74–99)
POTASSIUM SERPL-SCNC: 3.6 MMOL/L (ref 3.5–5.3)
SODIUM SERPL-SCNC: 138 MMOL/L (ref 136–145)

## 2024-09-25 PROCEDURE — 83880 ASSAY OF NATRIURETIC PEPTIDE: CPT

## 2024-09-25 PROCEDURE — 99214 OFFICE O/P EST MOD 30 MIN: CPT | Performed by: STUDENT IN AN ORGANIZED HEALTH CARE EDUCATION/TRAINING PROGRAM

## 2024-09-25 PROCEDURE — 3078F DIAST BP <80 MM HG: CPT | Performed by: STUDENT IN AN ORGANIZED HEALTH CARE EDUCATION/TRAINING PROGRAM

## 2024-09-25 PROCEDURE — 3077F SYST BP >= 140 MM HG: CPT | Performed by: STUDENT IN AN ORGANIZED HEALTH CARE EDUCATION/TRAINING PROGRAM

## 2024-09-25 PROCEDURE — 80048 BASIC METABOLIC PNL TOTAL CA: CPT

## 2024-09-25 PROCEDURE — 36415 COLL VENOUS BLD VENIPUNCTURE: CPT

## 2024-09-25 RX ORDER — LOSARTAN POTASSIUM 25 MG/1
50 TABLET ORAL DAILY
Qty: 60 TABLET | Refills: 11 | Status: SHIPPED | OUTPATIENT
Start: 2024-09-25 | End: 2025-09-25

## 2024-09-25 NOTE — PROGRESS NOTES
Chief Complaint:   venous insufficiency and Hypertension (Follow up)     History Of Present Illness:    Jericho Katz is a 24 y.o. male Returns to clinic for follow-up.  Patient is taking torsemide which has helped to some extent however he is currently using a desk job so he is noticed some increased swelling in his legs.  He ordered compression hoses but they need to be resized.  He is yet to schedule with lymphedema clinic.  Blood pressures are slightly improved with losartan 25 mg daily.  Labs are pending.     Last Recorded Vitals:  Vitals:    09/25/24 1041   BP: 142/60   BP Location: Left arm   Patient Position: Sitting   BP Cuff Size: Large adult   Pulse: 102   SpO2: 96%   Height: 1.829 m (6')       Review of Systems  ROS      Allergies:  Patient has no known allergies.    Outpatient Medications:  Current Outpatient Medications   Medication Instructions    buPROPion (Wellbutrin) 75 mg tablet 1 po every day for 3 days then increase to 1 po bid    cholecalciferol (Vitamin D3) 1,250 mcg (50,000 unit) tablet 1 po a week for 8 weeks. After completing take otc vitamin D 2000 units a day.    compression socks, x-large misc 25 mm, miscellaneous, Daily, 25-30 mmHG of pressure for lymphedema    losartan (COZAAR) 50 mg, oral, Daily    torsemide (DEMADEX) 20 mg, oral, Daily PRN       Physical Exam:  General: No acute distress,  A&O x3, morbidly obese male  Skin: Warm and dry  Neck: JVD is not elevated  ENT: Moist mucous membranes no lesions appreciated  Pulmonary: CTAB  Cards: Regular rate rhythm, no murmurs gallops or rubs appreciated normal S1-S2  Abdomen: Soft nontender nondistended  Extremities: No edema or cyanosis  Psych: Appropriate mood and affect        Last Labs:  CBC -  Lab Results   Component Value Date    WBC 8.6 06/28/2024    HGB 12.8 (L) 06/28/2024    HCT 37.2 (L) 06/28/2024    MCV 83 06/28/2024     06/28/2024       CMP -  Lab Results   Component Value Date    CALCIUM 9.8 06/28/2024    PROT 7.3  06/28/2024    ALBUMIN 4.7 06/28/2024    AST 14 06/28/2024    ALT 31 06/28/2024    ALKPHOS 54 06/28/2024    BILITOT 0.7 06/28/2024       LIPID PANEL -   Lab Results   Component Value Date    CHOL 138 08/18/2020    TRIG 104 08/18/2020    HDL 33.5 (A) 08/18/2020    CHHDL 4.1 08/18/2020    LDLF 84 08/18/2020    VLDL 21 08/18/2020    NHDL 105 08/18/2020       RENAL FUNCTION PANEL -   Lab Results   Component Value Date    GLUCOSE 139 (H) 06/28/2024     06/28/2024    K 4.5 06/28/2024     06/28/2024    CO2 26 06/28/2024    ANIONGAP 13 06/28/2024    BUN 18 06/28/2024    CREATININE 0.93 06/28/2024    CALCIUM 9.8 06/28/2024    ALBUMIN 4.7 06/28/2024        Lab Results   Component Value Date    BNP 6 06/28/2024    HGBA1C 5.0 08/18/2020       Last Cardiology Tests:  ECG:  Encounter Date: 06/07/24   ECG 12 lead (Clinic Performed)    Narrative    Normal sinus rhythm        Echo:  No results found for this or any previous visit from the past 1095 days.       Ejection Fractions:  EF   Date/Time Value Ref Range Status   06/20/2024 02:40 PM 63 %      Assessment and Plan    1.  Nonpitting bilateral lower extreme edema: Multifactorial mostly driven by a component of lymphedema and venous insufficiency in the setting of morbid obesity.  No evidence of heart failure.  Normal LV function.  -Initially tried a course of Lasix and spironolactone which patient is since discontinued  -Discussed in detail with patient and mother that diuretics alone are unlikely to solve problem.  Patient will need a more comprehensive plan that will include weight loss, referral to lymphedema clinic, use of diuretics when needed and leg elevation   -Continue torsemide 20 mg as needed for worsening lower extreme edema  -Referral to lymphedema clinic     2.  Hypertension: Blood pressures are poorly controlled.    Increase losartan to 50 mg daily  -Labs pending     3.  Morbid obesity: Patient has referral to weight loss clinic.      Increase losartan  to 50 mg daily  Keep appointment with lymphedema clinic  Return to clinic in 6 months     (This note was generated with voice recognition software and may contain errors including spelling, grammar, syntax and missed recognition of what was dictated, of which may not have been fully corrected)      Ryland Johnson MD PhD

## 2025-02-21 ENCOUNTER — TELEMEDICINE (OUTPATIENT)
Dept: PRIMARY CARE | Facility: CLINIC | Age: 25
End: 2025-02-21
Payer: COMMERCIAL

## 2025-02-21 DIAGNOSIS — J06.9 ACUTE UPPER RESPIRATORY INFECTION: Primary | ICD-10-CM

## 2025-02-21 RX ORDER — BENZONATATE 200 MG/1
200 CAPSULE ORAL 3 TIMES DAILY PRN
Qty: 30 CAPSULE | Refills: 0 | Status: SHIPPED | OUTPATIENT
Start: 2025-02-21 | End: 2025-03-23

## 2025-02-21 RX ORDER — FLUTICASONE PROPIONATE 50 MCG
1 SPRAY, SUSPENSION (ML) NASAL 2 TIMES DAILY PRN
Qty: 16 G | Refills: 0 | Status: SHIPPED | OUTPATIENT
Start: 2025-02-21 | End: 2026-02-21

## 2025-02-21 ASSESSMENT — PATIENT HEALTH QUESTIONNAIRE - PHQ9
1. LITTLE INTEREST OR PLEASURE IN DOING THINGS: SEVERAL DAYS
2. FEELING DOWN, DEPRESSED OR HOPELESS: SEVERAL DAYS
SUM OF ALL RESPONSES TO PHQ9 QUESTIONS 1 & 2: 2
10. IF YOU CHECKED OFF ANY PROBLEMS, HOW DIFFICULT HAVE THESE PROBLEMS MADE IT FOR YOU TO DO YOUR WORK, TAKE CARE OF THINGS AT HOME, OR GET ALONG WITH OTHER PEOPLE: NOT DIFFICULT AT ALL

## 2025-02-21 NOTE — PROGRESS NOTES
Chief Complaint   Patient presents with    Cough    Nasal Congestion    Abdominal Pain    Fatigue    Vomiting    Poor Appetite     Pt presents for telephone visit with c/o abdominal discomfort, emesis, cough that is productive with clear phlegm, poor appetite and nasal congestion.  Onset 2 days.  Pt unable to provide vital signs.     [phone]  Virtual or Telephone Consent    An interactive audio and video telecommunication system which permits real time communications between the patient (at the originating site) and provider (at the distant site) was utilized to provide this telehealth service.   Verbal consent was requested and obtained from Jericho Katz on this date, 02/21/25 for a telehealth visit.   Onset 2-3 days. No known sick contacts.   Onset -woke up with head pressure, stuffy, coughing.  Then little bit of stomach pains here and there.   He did vomit-states was not a lot. This was 1-2 days ago. No vomiting since then. Does feel nauseated in am. Thinks it is from his nose draining. Gags when he coughs. Notices it more in am.   Clear sputum.  +diarrhea. No BM today.  States not achy.   Cough is still there but getting better.     Even before sick would have chronic nasal  congestion in am.     HPI:  Jericho Katz 24 y.o.   male    Patient Active Problem List   Diagnosis    Closed dislocation of left patella    Knee MCL sprain    Lipoma of breast    Low back pain radiating to both legs    Low testosterone    Headache    Morbid obesity (Multi)    Obstructive sleep apnea syndrome    Patellar instability    Mild intermittent asthma    Closed traumatic dislocation of left patellofemoral joint    Petechiae    Lymphedema    Lower extremity edema    Essential hypertension    Anxiety   cardiology for LE edema.Dr Ryland Johnson  ECHO and US done. Dx lymphedema. Referred to lymphedema clinic.    Single. No children.   Quit smoking around age [age]. Vapes  Working on [employer]  Lives with parents.  Working in a law  "office.  Desk job.      There were no vitals taken for this visit.  There is no height or weight on file to calculate BMI.        Lab Results   Component Value Date    GLUCOSE 140 (H) 09/25/2024    CALCIUM 9.0 09/25/2024     09/25/2024    K 3.6 09/25/2024    CO2 27 09/25/2024     09/25/2024    BUN 13 09/25/2024    CREATININE 1.01 09/25/2024      Lab Results   Component Value Date    WBC 8.6 06/28/2024    HGB 12.8 (L) 06/28/2024    HCT 37.2 (L) 06/28/2024    MCV 83 06/28/2024     06/28/2024      Lab Results   Component Value Date    CHOL 138 08/18/2020    CHOL 144 02/08/2019     Lab Results   Component Value Date    HDL 33.5 (A) 08/18/2020    HDL 27.8 (A) 02/08/2019     No results found for: \"LDLCALC\"  Lab Results   Component Value Date    TRIG 104 08/18/2020    TRIG 152 (H) 02/08/2019     Labs June 2024 cortisol, T3, T4, testosterone, vitamin D, CBC, TSH, uric acid, CMP, BNP  Testosterone low  Vitamin D low 14  Hemoglobin low 12.8    1. Acute upper respiratory infection (Primary)  - fluticasone (Flonase) 50 mcg/actuation nasal spray; Administer 1 spray into each nostril 2 times a day as needed for rhinitis. Shake gently. Before first use, prime pump. After use, clean tip and replace cap.  Dispense: 16 g; Refill: 0  - benzonatate (Tessalon) 200 mg capsule; Take 1 capsule (200 mg) by mouth 3 times a day as needed for cough. Do not crush or chew.  Dispense: 30 capsule; Refill: 0  If refractory to be seen.   Hold on antibiotic as feels that cough is improving.     Also can use zyrtec, claritin, or allegra. Also advised neti pot.   If does not work for chronic nasal symptoms then pt to call for referral to allergist.     Time 10 minutes direct with patient  Prep and charting 4 minutes  Current Outpatient Medications on File Prior to Visit   Medication Sig Dispense Refill    cholecalciferol (Vitamin D3) 1,250 mcg (50,000 unit) tablet 1 po a week for 8 weeks. After completing take otc vitamin D 2000 " units a day. 8 tablet 0    compression socks, x-large misc 25 mm once daily. 25-30 mmHG of pressure for lymphedema 1 each 6    losartan (Cozaar) 25 mg tablet Take 2 tablets (50 mg) by mouth once daily. 60 tablet 11    torsemide (Demadex) 20 mg tablet Take 1 tablet (20 mg) by mouth once daily as needed (Leg swelling). 30 tablet 11    buPROPion (Wellbutrin) 75 mg tablet 1 po every day for 3 days then increase to 1 po bid (Patient not taking: Reported on 2/21/2025) 60 tablet 1     No current facility-administered medications on file prior to visit.         Ita Perea MD

## 2025-03-26 ENCOUNTER — APPOINTMENT (OUTPATIENT)
Dept: CARDIOLOGY | Facility: CLINIC | Age: 25
End: 2025-03-26
Payer: COMMERCIAL

## 2025-03-26 ENCOUNTER — TELEPHONE (OUTPATIENT)
Dept: CARDIOLOGY | Facility: CLINIC | Age: 25
End: 2025-03-26
Payer: COMMERCIAL

## 2025-03-26 NOTE — TELEPHONE ENCOUNTER
Lizzette called to say Jericho needs a prescription for new Compression Stockings. He can be reached at:    [phone]

## 2025-04-16 ENCOUNTER — TELEMEDICINE (OUTPATIENT)
Dept: PRIMARY CARE | Facility: CLINIC | Age: 25
End: 2025-04-16
Payer: COMMERCIAL

## 2025-04-16 DIAGNOSIS — J45.20 MILD INTERMITTENT ASTHMA, UNSPECIFIED WHETHER COMPLICATED (HHS-HCC): ICD-10-CM

## 2025-04-16 DIAGNOSIS — E78.6 LOW HDL (UNDER 40): ICD-10-CM

## 2025-04-16 DIAGNOSIS — E79.0 HIGH BLOOD URIC ACID LEVEL: ICD-10-CM

## 2025-04-16 DIAGNOSIS — D64.9 ANEMIA, UNSPECIFIED TYPE: ICD-10-CM

## 2025-04-16 DIAGNOSIS — E55.9 VITAMIN D DEFICIENCY: ICD-10-CM

## 2025-04-16 DIAGNOSIS — R73.9 HYPERGLYCEMIA: Primary | ICD-10-CM

## 2025-04-16 DIAGNOSIS — J06.9 UPPER RESPIRATORY TRACT INFECTION, UNSPECIFIED TYPE: ICD-10-CM

## 2025-04-16 PROCEDURE — 99214 OFFICE O/P EST MOD 30 MIN: CPT | Performed by: INTERNAL MEDICINE

## 2025-04-16 RX ORDER — AZITHROMYCIN 250 MG/1
TABLET, FILM COATED ORAL
Qty: 6 TABLET | Refills: 0 | Status: SHIPPED | OUTPATIENT
Start: 2025-04-16

## 2025-04-16 ASSESSMENT — PATIENT HEALTH QUESTIONNAIRE - PHQ9
SUM OF ALL RESPONSES TO PHQ9 QUESTIONS 1 & 2: 0
1. LITTLE INTEREST OR PLEASURE IN DOING THINGS: NOT AT ALL
2. FEELING DOWN, DEPRESSED OR HOPELESS: NOT AT ALL

## 2025-04-16 NOTE — PROGRESS NOTES
Chief Complaint   Patient presents with    Cough    Nasal Congestion     Pt presents for virtual appt with cough cough that is productive of clear phlegm, nasal congestion and post nasal drip.  Onset 5-7 days.  Pt unable to provide vital signs.      Today tele visit per patient's request  Last 02/2025 tele  Cough productive of clear white sputum.  Onset of symptoms about a week.  No fever or chills.  Does have a raspy voice.  Had diarrhea 1 time but does not think it was related to it.  He denies any known sick contacts.  He states he cannot take over-the-counter medications because they make him too sleepy.  He does have about 1 coughing attack a day.  He denies any wheezing.  He does not have generalized myalgias but has some soreness related to his cough.  Did not do a home COVID test.    Regarding low testosterone he states his insurance does not cover an endocrinologist.  We discussed that insurance is cannot block seeing a specialist.  He may need to find which endocrinologist takes his insurance.  He can check with his insurance company to see which endocrinologist is in the area.  He verbalized understanding of this.  His mom is in the background and she verbalized understanding of this as well    HPI:  Jericho PENNIE Katz 24 y.o.   male    Patient Active Problem List   Diagnosis    Knee MCL sprain    Lipoma of breast    Low back pain radiating to both legs    Low testosterone    Headache    Morbid obesity (Multi)    Obstructive sleep apnea syndrome    Patellar instability    Mild intermittent asthma    Closed traumatic dislocation of left patellofemoral joint    Petechiae    Lymphedema    Lower extremity edema    Essential hypertension    Anxiety   cardiology for LE edema.Dr Ryland Johnson  ECHO and US done. Dx lymphedema. Referred to lymphedema clinic.    Single. No children.   Quit smoking around age [age]. Vapes  Working on [employer]  Lives with parents.  Working in a law office.  Desk job.      There were no  "vitals taken for this visit.  There is no height or weight on file to calculate BMI.  Able to speak in full sentences.  Does not appear in respiratory distress      Lab Results   Component Value Date    GLUCOSE 140 (H) 09/25/2024    CALCIUM 9.0 09/25/2024     09/25/2024    K 3.6 09/25/2024    CO2 27 09/25/2024     09/25/2024    BUN 13 09/25/2024    CREATININE 1.01 09/25/2024      Lab Results   Component Value Date    WBC 8.6 06/28/2024    HGB 12.8 (L) 06/28/2024    HCT 37.2 (L) 06/28/2024    MCV 83 06/28/2024     06/28/2024      Lab Results   Component Value Date    CHOL 138 08/18/2020    CHOL 144 02/08/2019     Lab Results   Component Value Date    HDL 33.5 (A) 08/18/2020    HDL 27.8 (A) 02/08/2019     No results found for: \"LDLCALC\"  Lab Results   Component Value Date    TRIG 104 08/18/2020    TRIG 152 (H) 02/08/2019     Labs June 2024 cortisol, T3, T4, testosterone, vitamin D, CBC, TSH, uric acid, CMP, BNP  Testosterone low  Vitamin D low 14  Hemoglobin low 12.8      1. Hyperglycemia (Primary)  - Comprehensive Metabolic Panel; Future  - Hemoglobin A1C; Future  - CBC; Future  - Comprehensive Metabolic Panel  - Hemoglobin A1C  - CBC    2. Vitamin D deficiency  - Vitamin D 25-Hydroxy,Total (for eval of Vitamin D levels); Future  - Vitamin D 25-Hydroxy,Total (for eval of Vitamin D levels)    3. Anemia, unspecified type  - CBC; Future  - Ferritin; Future  - Iron and TIBC; Future  - Vitamin B12; Future  - CBC  - Ferritin  - Iron and TIBC  - Vitamin B12    4. High blood uric acid level  - Uric acid; Future  - Uric acid    5. Low HDL (under 40)  - Lipid Panel; Future  - Lipid Panel    6. Mild intermittent asthma, unspecified whether complicated ([facility])  7. Upper respiratory tract infection, unspecified type  Return to care if symptoms refractory or worsening. Patient verbalized understanding and agreement with plan.   Should be seen in person if any worsening or does not resolve with antibiotic.   - " azithromycin (Zithromax) 250 mg tablet; Take 2 tabs (500 mg) by mouth today, than 1 daily for 4 days.  Dispense: 6 tablet; Refill: 0    Current Outpatient Medications on File Prior to Visit   Medication Sig Dispense Refill    compression socks, x-large misc 25 mm once daily. 25-30 mmHG of pressure for lymphedema 1 each 6    losartan (Cozaar) 25 mg tablet Take 2 tablets (50 mg) by mouth once daily. 60 tablet 11    [DISCONTINUED] cholecalciferol (Vitamin D3) 1,250 mcg (50,000 unit) tablet 1 po a week for 8 weeks. After completing take otc vitamin D 2000 units a day. (Patient not taking: Reported on 4/16/2025) 8 tablet 0    [DISCONTINUED] fluticasone (Flonase) 50 mcg/actuation nasal spray Administer 1 spray into each nostril 2 times a day as needed for rhinitis. Shake gently. Before first use, prime pump. After use, clean tip and replace cap. (Patient not taking: Reported on 4/16/2025) 16 g 0    [DISCONTINUED] torsemide (Demadex) 20 mg tablet Take 1 tablet (20 mg) by mouth once daily as needed (Leg swelling). (Patient not taking: Reported on 4/16/2025) 30 tablet 11     No current facility-administered medications on file prior to visit.         Ita Perea MD

## 2025-05-12 DIAGNOSIS — R60.0 LOWER EXTREMITY EDEMA: ICD-10-CM

## 2025-05-12 RX ORDER — TORSEMIDE 20 MG/1
20 TABLET ORAL DAILY PRN
Qty: 30 TABLET | Refills: 3 | Status: SHIPPED | OUTPATIENT
Start: 2025-05-12 | End: 2026-05-12

## 2025-06-19 ENCOUNTER — APPOINTMENT (OUTPATIENT)
Dept: PRIMARY CARE | Facility: CLINIC | Age: 25
End: 2025-06-19
Payer: COMMERCIAL

## 2025-07-07 ENCOUNTER — APPOINTMENT (OUTPATIENT)
Dept: CARDIOLOGY | Facility: CLINIC | Age: 25
End: 2025-07-07
Payer: COMMERCIAL

## 2025-07-29 ENCOUNTER — APPOINTMENT (OUTPATIENT)
Dept: CARDIOLOGY | Facility: CLINIC | Age: 25
End: 2025-07-29
Payer: COMMERCIAL

## 2025-08-27 ENCOUNTER — APPOINTMENT (OUTPATIENT)
Dept: PRIMARY CARE | Facility: CLINIC | Age: 25
End: 2025-08-27
Payer: COMMERCIAL

## 2025-08-28 ENCOUNTER — APPOINTMENT (OUTPATIENT)
Dept: PRIMARY CARE | Facility: CLINIC | Age: 25
End: 2025-08-28
Payer: COMMERCIAL

## 2025-09-22 ENCOUNTER — APPOINTMENT (OUTPATIENT)
Dept: PRIMARY CARE | Facility: CLINIC | Age: 25
End: 2025-09-22
Payer: COMMERCIAL